# Patient Record
Sex: MALE | Race: WHITE
[De-identification: names, ages, dates, MRNs, and addresses within clinical notes are randomized per-mention and may not be internally consistent; named-entity substitution may affect disease eponyms.]

---

## 2019-02-21 ENCOUNTER — HOSPITAL ENCOUNTER (EMERGENCY)
Dept: HOSPITAL 76 - ED | Age: 19
Discharge: HOME | End: 2019-02-21
Payer: COMMERCIAL

## 2019-02-21 VITALS — SYSTOLIC BLOOD PRESSURE: 168 MMHG | DIASTOLIC BLOOD PRESSURE: 72 MMHG

## 2019-02-21 DIAGNOSIS — R03.0: ICD-10-CM

## 2019-02-21 DIAGNOSIS — F17.200: ICD-10-CM

## 2019-02-21 DIAGNOSIS — L03.114: Primary | ICD-10-CM

## 2019-02-21 PROCEDURE — 87070 CULTURE OTHR SPECIMN AEROBIC: CPT

## 2019-02-21 PROCEDURE — 87205 SMEAR GRAM STAIN: CPT

## 2019-02-21 PROCEDURE — 87181 SC STD AGAR DILUTION PER AGT: CPT

## 2019-02-21 PROCEDURE — 99283 EMERGENCY DEPT VISIT LOW MDM: CPT

## 2019-02-21 NOTE — ED PHYSICIAN DOCUMENTATION
History of Present Illness





- Stated complaint


Stated Complaint: L HAND BITE/SWELLING





- Chief complaint


Chief Complaint: General





- History obtained from


History obtained from: Patient





- History of Present Illness


Timing: Last night (Healthy 18-year-old gentleman who is right-handed and active

duty in the [employer] thinks he might of gotten bitten by something last night.  He 

has a pustule on the dorsum of the left hand with surrounding redness.  No 

fevers.)





Review of Systems


Constitutional: reports: Reviewed and negative


Nose: reports: Reviewed and negative


Cardiac: reports: Reviewed and negative





PD PAST MEDICAL HISTORY





- Past Medical History


Past Medical History: No





- Past Surgical History


Past Surgical History: No





- Present Medications


Home Medications: 


                                Ambulatory Orders











 Medication  Instructions  Recorded  Confirmed


 


Cephalexin [Keflex] 500 mg PO Q6H #28 capsule 02/21/19 


 


Sulfamethoxazole/Trimethoprim 1 each PO BID #14 tablet 02/21/19 





[Sulfamethoxazole-Tmp Ds Tablet]   














- Allergies


Allergies/Adverse Reactions: 


                                    Allergies











Allergy/AdvReac Type Severity Reaction Status Date / Time


 


No Known Drug Allergies Allergy   Verified 02/21/19 18:38














- Social History


Does the pt smoke?: Yes


Smoking Status: Current every day smoker


Does the pt drink ETOH?: No


Does the pt have substance abuse?: No





- Immunizations


Immunizations are current?: Yes





- POLST


Patient has POLST: No





PD ED PE NORMAL





- Vitals


Vital signs reviewed: Yes





- General


General: Alert and oriented X 3, No acute distress





- Extremities


Extremities: No calf tenderness / cord, Other (There is a tiny pustule on the 

dorsum of the left hand near the fourth MCP with cellulitis of most of the 

dorsum of the hand but no pain out of proportion to exam, crepitance, or limited

range of motion.  The pustule was expressed manually during examination and some

serous fluid was obtained and sent for culture.)





- Neuro


Neuro: Alert and oriented X 3, Normal speech





Results





- Vitals


Vitals: 





                               Vital Signs - 24 hr











  02/21/19





  18:36


 


Temperature 37.4 C


 


Heart Rate 79


 


Respiratory 20





Rate 


 


Blood Pressure 168/72 H


 


O2 Saturation 98








                                     Oxygen











O2 Source                      Room air

















Departure





- Departure


Disposition: 01 Home, Self Care


Clinical Impression: 


 Cellulitis of left hand





Condition: Good


Record reviewed to determine appropriate education?: Yes


Instructions:  Cellulitis Dc


Prescriptions: 


Cephalexin [Keflex] 500 mg PO Q6H #28 capsule


Sulfamethoxazole/Trimethoprim [Sulfamethoxazole-Tmp Ds Tablet] 1 each PO BID #14

tablet


Comments: 


We are performing a wound culture, the results should be done in 48-72 hours.  

If antibiotic change is necessary we will call you.  Return if worse in the 

meantime, especially if you develop increased pain, fevers, cannot keep down the

medication.  Otherwise follow-up with your physician in approximately 2-3 days.





Your blood pressure was elevated today on check into the emergency department.  

This does not mean that you have hypertension, it is a common phenomenon to come

to the emergency department and have elevated blood pressure.  I recommend that 

you see your primary care physician within the week to have it rechecked when 

you are feeling better.

## 2019-02-22 ENCOUNTER — HOSPITAL ENCOUNTER (OUTPATIENT)
Dept: HOSPITAL 76 - ED | Age: 19
Setting detail: OBSERVATION
LOS: 1 days | Discharge: HOME | End: 2019-02-23
Attending: SPECIALIST | Admitting: SPECIALIST
Payer: COMMERCIAL

## 2019-02-22 DIAGNOSIS — F17.220: ICD-10-CM

## 2019-02-22 DIAGNOSIS — L03.114: Primary | ICD-10-CM

## 2019-02-22 DIAGNOSIS — F17.210: ICD-10-CM

## 2019-02-22 LAB
ALBUMIN DIAFP-MCNC: 4.3 G/DL (ref 3.2–5.5)
ALBUMIN/GLOB SERPL: 1.6 {RATIO} (ref 1–2.2)
ALP SERPL-CCNC: 83 IU/L (ref 42–121)
ALT SERPL W P-5'-P-CCNC: 34 IU/L (ref 10–60)
ANION GAP SERPL CALCULATED.4IONS-SCNC: 12 MMOL/L (ref 6–13)
AST SERPL W P-5'-P-CCNC: 26 IU/L (ref 10–42)
BASOPHILS NFR BLD AUTO: 0.1 10^3/UL (ref 0–0.1)
BASOPHILS NFR BLD AUTO: 0.4 %
BILIRUB BLD-MCNC: 1.4 MG/DL (ref 0.2–1)
BUN SERPL-MCNC: 13 MG/DL (ref 6–20)
CALCIUM UR-MCNC: 8.9 MG/DL (ref 8.5–10.3)
CHLORIDE SERPL-SCNC: 100 MMOL/L (ref 101–111)
CO2 SERPL-SCNC: 24 MMOL/L (ref 21–32)
CREAT SERPLBLD-SCNC: 0.9 MG/DL (ref 0.6–1.2)
EOSINOPHIL # BLD AUTO: 0.3 10^3/UL (ref 0–0.7)
EOSINOPHIL NFR BLD AUTO: 2 %
ERYTHROCYTE [DISTWIDTH] IN BLOOD BY AUTOMATED COUNT: 13.6 % (ref 12–15)
GFRSERPLBLD MDRD-ARVRAT: 108 ML/MIN/{1.73_M2} (ref 89–?)
GLOBULIN SER-MCNC: 2.7 G/DL (ref 2.1–4.2)
GLUCOSE SERPL-MCNC: 135 MG/DL (ref 70–100)
HGB UR QL STRIP: 16 G/DL (ref 14–18)
LIPASE SERPL-CCNC: 23 U/L (ref 22–51)
LYMPHOCYTES # SPEC AUTO: 0.9 10^3/UL (ref 1.5–3.5)
LYMPHOCYTES NFR BLD AUTO: 5.7 %
MCH RBC QN AUTO: 30.9 PG (ref 27–31)
MCHC RBC AUTO-ENTMCNC: 34.3 G/DL (ref 32–36)
MCV RBC AUTO: 90.1 FL (ref 80–94)
MONOCYTES # BLD AUTO: 0.7 10^3/UL (ref 0–1)
MONOCYTES NFR BLD AUTO: 4.5 %
NEUTROPHILS # BLD AUTO: 13.5 10^3/UL (ref 1.5–6.6)
NEUTROPHILS # SNV AUTO: 15.5 X10^3/UL (ref 4.8–10.8)
NEUTROPHILS NFR BLD AUTO: 87.4 %
PDW BLD AUTO: 7.9 FL (ref 7.4–11.4)
PLATELET # BLD: 218 10^3/UL (ref 130–450)
PROT SPEC-MCNC: 7 G/DL (ref 6.7–8.2)
RBC MAR: 5.18 10^6/UL (ref 4.7–6.1)
SODIUM SERPLBLD-SCNC: 136 MMOL/L (ref 135–145)

## 2019-02-22 PROCEDURE — 80053 COMPREHEN METABOLIC PANEL: CPT

## 2019-02-22 PROCEDURE — 85025 COMPLETE CBC W/AUTO DIFF WBC: CPT

## 2019-02-22 PROCEDURE — 99283 EMERGENCY DEPT VISIT LOW MDM: CPT

## 2019-02-22 PROCEDURE — 96366 THER/PROPH/DIAG IV INF ADDON: CPT

## 2019-02-22 PROCEDURE — 83605 ASSAY OF LACTIC ACID: CPT

## 2019-02-22 PROCEDURE — 96365 THER/PROPH/DIAG IV INF INIT: CPT

## 2019-02-22 PROCEDURE — 83690 ASSAY OF LIPASE: CPT

## 2019-02-22 PROCEDURE — 36415 COLL VENOUS BLD VENIPUNCTURE: CPT

## 2019-02-22 PROCEDURE — 73220 MRI UPPR EXTREMITY W/O&W/DYE: CPT

## 2019-02-22 PROCEDURE — 87040 BLOOD CULTURE FOR BACTERIA: CPT

## 2019-02-22 RX ADMIN — SODIUM CHLORIDE, PRESERVATIVE FREE SCH: 5 INJECTION INTRAVENOUS at 17:36

## 2019-02-22 NOTE — MRI REPORT
Reason:  SPREADING CELULITTUS, POSS ABCESS

Procedure Date:  02/22/2019   

Accession Number:  [id number] / [id number]                    

Procedure:  MRI - Hand LT W/WO CPT Code:  

 

FULL RESULT:

 

 

IMPRESSION:llulitis. No drainable abscess collection. No evidence of 

osteomyelitis.

IMPRESSION:llulitis. No drainable abscess collection. No evidence of 

osteomyelitis.

## 2019-02-22 NOTE — HISTORY & PHYSICAL EXAMINATION
DATE OF SERVICE: 02/22/2019

Physician: Ivonne Cormier MD

 

PRIMARY CARE PROVIDER:  [facility].

 

ORTHOPEDIC SURGEON:  Elkin Spears.  Clinic [phone] or [phone], cell
phone [phone].

 

ADMITTING PROVIDER:  Ivonne Cormier MD.

 

CHIEF COMPLAINT:  Left hand infection unresponsive to outpatient management.

 

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 
personnel.  He is completely healthy and states that the only bad thing he does 
to his body is smoke half a pack per day.  He has no history of diabetes, 
immunosuppressive disorders, and is usually in good health.  He woke up 
yesterday morning with a painful left hand.  He had a small pimple between his 
fourth and fifth finger on the dorsum of his hand near his fourth knuckle.  That
part of the hand over the dorsum was red and swollen.  He was seen in the 
emergency room by Dr. Aram Keyes, where he was afebrile.  The pimple was 
popped, and a small amount of fluid was expressed.  He was put on empiric 
antibiotic therapy with Keflex and Bactrim.  He did receive a dose of IV 
antibiotics in the ER.  The patient did not fill his prescriptions overnight.  
He did not get any medication until this morning.  



He was seen in the family practice clinic with orthopedic consult by Dr. Elkin Spears at the [facility].  Dr. Spears felt that this patient has failed 
outpatient therapy, he can clearly see a delineation of the redness and heat 
where the pen marking from yesterday identifies yesterday's exam.  Today's exam 
shows inches of more skin recruited, more of the hand is swollen and tender.  
His white blood count is now 14.  He does not have a fever.

 

Dr. Spears has requested observation status.  He would like an MRI of the hand 
done to make sure there is no abscess in that knuckle space requiring surgery.

 

PAST MEDICAL HISTORY:  None.  Patient has never been hospitalized, never had any
surgeries.  He has no medical illnesses.

 

MEDICATIONS:  None.  He has not taken the Keflex or Bactrim prescribed to him 
until just this morning.  He does not take anything on a regular basis.

 

ALLERGIES:  NO KNOWN DRUG ALLERGIES.

 

SOCIAL HISTORY:  He is from [place].  He has been active duty personnel for 
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  He smokes 1/2 pack per day, as 
well as chew.  He does not drink at all because he is under age.

 

FAMILY HISTORY:  Mom is 41 years old and has pancreas divisum.  She has had 
bouts of pancreatitis.  But no high blood pressure, diabetes, cancer, heart 
attack, stroke, thyroid.

 

Dad is 42 and completely healthy.

 

Two younger sisters are completely healthy.

 

He has no children.  

 

He is FULL CODE status. 

 

PREVIOUS LEVEL OF FUNCTION:  Active duty [employer] personnel.  Completely independent
for activities of daily living, uses no durable medical equipment.  Drives, etc.

 

REVIEW OF SYSTEMS:  A review of systems was obtained for constitutional, ENT, 
cardiac, pulmonary, GI, , skin, joint, psych and CNS.  All were negative other
than for this current symptomatology of his left hand.

 

PHYSICAL EXAMINATION 

He is seen in the emergency room.

VITAL SIGNS:  Temperature is 37.1, pulse is 89, blood pressure 146/72, 
respirations 18, 99% on room air.

GENERAL:  He is a young 6-foot 3-inch, white male at 108 kilograms.  He is 
alert, oriented, in no acute distress, with his commanding officer/boss at the 
bedside.

HEENT:  Normocephalic, atraumatic skull.  Pupils equal, round, reactive to light
and accommodation.  Sclerae nonicteric.  Moist pink oral mucosa.  No facial 
asymmetry.  Speech normal.  Back of throat normal.

NECK:  Supple without goiter,  adenopathy or bruits.

LUNGS:  Clear to auscultation and percussion.  No crackles, rhonchi, or 
wheezing.  No increased respiratory effort speaking to me. 

CARDIAC:  Regular rate and rhythm without any murmurs, rubs or gallops.

ABDOMEN:  Soft, nontender.  No organomegaly.  Normal bowel sounds.

EXTREMITIES:  No clubbing, cyanosis or edema.  All grossly normal in appearance.
 That is, until you get to the left hand.  He has the same pimple boil between 
the fourth and fifth fingers on the left hand on the dorsum of his hand.  It is 
closer to his fourth knuckle.  Redness has enveloped fifth, fourth and third 
fingers on the dorsum.  There is diffuse finger swelling of sausage digits.  
Redness and cellulitis and heat has extended the entire dorsum of his hand up to
almost the wrist now.  There is a small spot above the ulnar joint space where 
the redness and heat are extending up there.  He has flexion and extension of 
his hand intact.  Flexion and extension of his finger digits and metacarpals.

NEUROLOGIC:  He is alert and oriented to person, place and time.  Follows 2-step
commands normally.  Speech is normal.  Cranial nerves 2-12 are normal.  Upper 
and lower extremity strength testing is normal.  Upper and lower extremity 
sensation is normal.  He has no gait ataxia, and can sit to transfer to a 
standing position without any difficulty or gait ataxia.  He does it in less 
than 15 seconds. 

 

LABORATORY DATA:  Sodium 136, potassium 3.6, BUN 13, creatinine 0.9, glucose 
135, lactic acid 1.3, total bilirubin 1.4.  Liver enzymes normal.  Lipase 23.  
White cell count is 15.5, hemoglobin 16, hematocrit 46.  Neutrophils are high.

 

ASSESSMENT AND PLAN 

1.  Cellulitis, left hand.  There is no previous blow to the hand.  No injury.  
He lives in a dorm situation with multiple other [employer] personnel.  No one else 
has boils or furuncles.  I suspect methicillin-resistant Staphylococcus aureus.

PLAN

1.  Observation stay.

2.  ATTESTATION:  The patient will be discharged within 96 hours.

3.  Vancomycin per pharmacy protocol.

4.  Check daily BMP and CBC to assess response.

5.  Orthopedic consult.

6.  MRI of left hand to assess for abscess.

 

The patient has failed outpatient management with worsening infection and a 
rising white cell count.

 

2.  FULL CODE STATUS.

 

3.  Deep venous thrombosis prophylaxis will be SERVANDO hose.  He is at low risk.  
Ambulatory.

 

 

DD: 02/22/2019 15:42

TD: 02/22/2019 15:56

Job #: [id number]



**************************** 

Revised 02/26/2019 jll

Account/Transcription Correction

Orig. signed 02/23/2019 @ 1639

****************************

OZZIE

## 2019-02-22 NOTE — ED PHYSICIAN DOCUMENTATION
History of Present Illness





- Stated complaint


Stated Complaint: LT HAND SWELLING/SENT BY 





- Chief complaint


Chief Complaint: General





- History obtained from


History obtained from: Patient, Friend





- History of Present Illness


Timing: Yesterday


Pain level max: 6


Pain level now: 4





- Additonal information


Additional information: 





18-year-old male with cellulitis of the left hand, seen here last night and 

given a dose of Bactrim.  Symptoms have worsened today.  Was told to return for 

admission to the hospital. Worse with movement, better with rest





Review of Systems


Ten Systems: 10 systems reviewed and negative


Constitutional: denies: Fever, Chills


Respiratory: denies: Cough


GI: denies: Abdominal Pain, Vomiting


Skin: denies: Rash





PD PAST MEDICAL HISTORY





- Past Medical History


Past Medical History: No





- Past Surgical History


Past Surgical History: No





- Present Medications


Home Medications: 


                                Ambulatory Orders











 Medication  Instructions  Recorded  Confirmed


 


Cephalexin [Keflex] 500 mg PO Q6H #28 capsule 02/21/19 


 


Sulfamethoxazole/Trimethoprim 1 each PO BID #14 tablet 02/21/19 





[Sulfamethoxazole-Tmp Ds Tablet]   














- Allergies


Allergies/Adverse Reactions: 


                                    Allergies











Allergy/AdvReac Type Severity Reaction Status Date / Time


 


No Known Drug Allergies Allergy   Verified 02/22/19 14:01














- Social History


Does the pt smoke?: Yes


Smoking Status: Current every day smoker


Does the pt drink ETOH?: No


Does the pt have substance abuse?: No





- Immunizations


Immunizations are current?: Yes





- POLST


Patient has POLST: No





PD ED PE NORMAL





- Vitals


Vital signs reviewed: Yes





- General


General: Alert and oriented X 3, No acute distress





- HEENT


HEENT: Moist mucous membranes





- Neck


Neck: Supple, no meningeal sign





- Cardiac


Cardiac: RRR





- Respiratory


Respiratory: No respiratory distress, Clear bilaterally





- Abdomen


Abdomen: Soft, Non tender, Non distended





- Derm


Derm: Warm and dry





- Extremities


Extremities: Other (L hand swelling and erythema (5x6cm) to the dorsum of the 

hand. Pain in the dorsum of the hand with movement of the fingers. NVI. No 

crepitus. )





- Neuro


Neuro: Alert and oriented X 3





Results





- Vitals


Vitals: 


                               Vital Signs - 24 hr











  02/22/19





  13:56


 


Temperature 37.1 C


 


Heart Rate 89


 


Respiratory 18





Rate 


 


O2 Saturation 99








                                     Oxygen











O2 Source                      Room air

















- Labs


Labs: 


                                Laboratory Tests











  02/22/19 02/22/19 02/22/19





  14:29 14:29 14:32


 


WBC  15.5 H  


 


RBC  5.18  


 


Hgb  16.0  


 


Hct  46.6  


 


MCV  90.1  


 


MCH  30.9  


 


MCHC  34.3  


 


RDW  13.6  


 


Plt Count  218  


 


MPV  7.9  


 


Neut # (Auto)  13.5 H  


 


Lymph # (Auto)  0.9 L  


 


Mono # (Auto)  0.7  


 


Eos # (Auto)  0.3  


 


Baso # (Auto)  0.1  


 


Absolute Nucleated RBC  0.01  


 


Nucleated RBC %  0.0  


 


Sodium   136 


 


Potassium   3.6 


 


Chloride   100 L 


 


Carbon Dioxide   24 


 


Anion Gap   12.0 


 


BUN   13 


 


Creatinine   0.9 


 


Estimated GFR (MDRD)   108 


 


Glucose   135 H 


 


Lactic Acid    1.3


 


Calcium   8.9 


 


Total Bilirubin   1.4 H 


 


AST   26 


 


ALT   34 


 


Alkaline Phosphatase   83 


 


Total Protein   7.0 


 


Albumin   4.3 


 


Globulin   2.7 


 


Albumin/Globulin Ratio   1.6 


 


Lipase   23 














PD MEDICAL DECISION MAKING





- ED course


Complexity details: reviewed results, re-evaluated patient, considered 

differential, d/w patient


ED course: 





18-year-old male with cellulitis worsening despite oral antibiotics of the 

dorsum of the left hand.  Pain with range of motion of the fingers.  No palmar 

tenderness.  No evidence of deep space infection.  No crepitus.  Was supposed to

be directly placed in obs to the hospital, but was registered in the emergency 

department, therefore I contacted the hospitalist and it was requested that I 

order the MRI in the emergency department due to the time of day.  Therefore 

this was performed.  IV antibiotics given.  Blood drawn.  Patient will be placed

in observation with the hospitalist, Dr. Cormier.





This document was made in part using voice recognition software. While efforts 

are made to proofread this document, sound alike and grammatical errors may 

occur.





Departure





- Departure


Disposition: ED Place in Observation


Clinical Impression: 


 Cellulitis of left hand





Condition: Stable

## 2019-02-23 VITALS — DIASTOLIC BLOOD PRESSURE: 53 MMHG | SYSTOLIC BLOOD PRESSURE: 134 MMHG

## 2019-02-23 RX ADMIN — SODIUM CHLORIDE SCH MLS/HR: 9 INJECTION, SOLUTION INTRAVENOUS at 00:17

## 2019-02-23 RX ADMIN — SODIUM CHLORIDE SCH MLS/HR: 9 INJECTION, SOLUTION INTRAVENOUS at 08:26

## 2019-02-23 RX ADMIN — SODIUM CHLORIDE, PRESERVATIVE FREE SCH: 5 INJECTION INTRAVENOUS at 00:33

## 2019-02-23 RX ADMIN — SODIUM CHLORIDE, PRESERVATIVE FREE SCH: 5 INJECTION INTRAVENOUS at 10:42

## 2019-02-23 NOTE — CONSULTATION NOTE
Referring Provider


Name of Referring Provider:: Ivonne Cormier


Consult Date: 02/23/19





Chief Complaint





- Chief Complaint


Chief Complaint: Left hand cellulitis





History of Present Illness





- History of Present Illness


HPI Comment/Other: 





Patient is a 18-year-old right-hand-dominant male who presented to the aviation 

medical clinic at [facility] on the morning of 22 February 2019 for follow-up of an ED visit at Located within [facility] on 21 February 2019.  The patient reports that on Thursday morning he awoke with a 

pustule on the dorsal aspect of his left hand ring finger and had associated 

redness pain and swelling.  He presented to the [facility] ED for evaluation,

and was diagnosed with left hand cellulitis.  He was given a dose of antibiotics

and prescription for Keflex and Septra, which he was attempting to fill on the 

morning of 22 February.  





He was seen in aviation medicine, and worsening of the cellulitis was noted, and

the patient was walked over to the orthopedic clinic.  He denied fever or 

chills.  When directly questioned about possible fight bite mechanism of injury 

he was adamant that he had not been involved in an altercation nor punched 

anything.  He denies any specific memory of insect spider or animal bites.  He 

is otherwise healthy, and has not had an occurrence like this previously.





On exam in clinic, his hand was erythematous warm and swollen with significant 

hyperemia, he was diffusely tender to palpation, worse near the interspace 

between the third and fourth MCP J's and the fourth and fifth MTPJ's.  He did 

not have volar tenderness along the flexor tendon sheath nor pain along the 

flexor tendon with passive extension of the digit.  He did not have pain in the 

thenar eminence hyperthenar eminence or mid space of the hand.  He was able to 

weakly actively flex and extend the digits due to pain.  Axial loading of the 

third fourth and fifth MCP J's did not increased pain at the joint.  The 

erythema was noted to extend approximately to the previously marked demarcation 

line on the dorsum of his hand.  He reports that he had not been splinted and 

nor elevated since discharge from the ED.  





He had an elevated white count at 14.6 and an elevated C-reactive protein at 3.7

mg/dL. Given the progression of his symptoms in less than 24 hours, there was 

initial concern for a possible deep space infection or collar-button abscess.  

Recommendation was made to admit the patient to [facility] for observation, 

splinting, IV antibiotics, and an MRI to evaluate for possible abscess.  The 

patient was admitted on 22February, and the MRI was completed on the evening of 

22 February. The MRI findings were consistent with cellulitis, without abscess 

or osteomyelitis.








The patient was examined at bedside on the morning of 23 February. He had been 

splinted and elevated overnight and received IV vancomycin.  There was a 

significant improvement in his symptoms, with less swelling, erythema, and 

improved finger range of motion.  His hand was globally less tender to 

palpation.  He remained afebrile overnight. His pain was well controlled.





History





- Past Medical History


Cardiovascular: reports: None


Neuro: reports: None


Endocrine/Autoimmune: reports: None


GI: reports: None


: reports: None


HEENT: reports: None


Psych: reports: None


Musculoskeletal: reports: None, Other (As per HPI)


Derm: reports: None, Other (Cellulitis as per HPI)


MRSA Hx?: No





- Family & Social History


Living arrangement: Other ([facility])





- Substance History


Use: Uses substance without health or social issues: Tobacco (Cigarettes and 

chew)


Tobacco Details: Cigarettes, Chewing Tobacco





- POLST


Patient has POLST: No





Meds/Allgy





- Home Medications


Home Medications: 


                                Ambulatory Orders











 Medication  Instructions  Recorded  Confirmed


 


Cephalexin [Keflex] 500 mg PO QID 02/23/19 02/23/19


 


Sulfamethoxazole/Trimethoprim 1 each PO BID #14 tablet 02/23/19 





[Bactrim 400-80 mg Tablet]   


 


oxyCODONE [Roxicodone] 5 mg PO Q4HR PRN #30 tablet 02/23/19 














- Allergies


Allergies/Adverse Reactions: 


                                    Allergies











Allergy/AdvReac Type Severity Reaction Status Date / Time


 


No Known Drug Allergies Allergy   Verified 02/22/19 14:01














Review of Systems





- Constitutional


Constitutional: denies: Fatigue, Fever, Chills, Malaise, Weakness, Night sweats





- Cardiovascular


Cariovascular: denies: Palpitations, Chest pain





- Respiratory


Respiratory: denies: Cough, Wheezing





- Gastrointestinal


Gastrointestinal: denies: Constipation, Diarrhea, Nausea, Vomiting





- Musculoskeletal


Musculoskeletal: reports: Other (As per HPI left hand pain and swelling)





- Integumentary


Integumentary: reports: Other (As per HPI cellulitis)





- Neurological


Neurological: denies: General weakness, Focal weakness, Numbness





- Hematologic/Lymphatic


Hematologic/Lymphatic: denies: Recurrent infections





- All Other Systems


All Other Systems: reports: Reviewed and negative





Exam





- Vital Signs


Reviewed Vital Signs: Yes


Vital Signs: 





                                Vital Signs x48h











  Temp Pulse Resp BP Pulse Ox


 


 02/23/19 07:56  36.8 C  65  16  117/50 L  97


 


 02/23/19 04:04  37.0 C  65  16  130/57 L  97














- Physical Exam


General Appearance: positive: No acute distress


Eyes Bilateral: positive: Normal inspection


Neck: positive: Nml inspection


Respiratory: positive: No respiratory distress


Peripheral Pulses: positive: 2+


Extremities: positive: Other (Left hand:    Skin: Small scabbed area near the 

base of the ring finger possible phalanx, this was the previously described 

pustule. Inspection: Erythema and swelling improved since evaluation on 22 February.  Skin less hyperemic.  Decreased swelling with improved hand contours.

 Palpation: Less tender to palpation over the dorsum of the hand, especially in 

the 34 and 45 MCP J interspaces.  No thenar tenderness, no hypothenar 

tenderness.   ROM: Can flex and extend the fingers can abduct and adduct the 

fingers tightly. Sensation: Intact to light touch median radial ulnar 

distributions and ulnar and radial tip of the affected digit. Motor: 5 out of 5 

EPL, FPL, IO Vascular: 2+ radial pulse.)


Neurologic/Psychiatric: positive: Oriented x3





Conclusion/Plan





- Diagnosis


Diagnosis: Left hand cellulitis





- Plan


Plan: 





18-year-old right-hand-dominant male with exam and imaging consistent with left 

hand cellulitis.  Since admission for observation his clinical exam has improved

 with splinting, elevation and treatment with IV vancomycin.  He has remained 

afebrile.  Repeat CBC at 0 545 this morning showed a decrease in his white count

 to 10.3K.


- Given his improvement in both clinical and laboratory parameters, transition 

to oral antibiotics consisting of Keflex and Septra DS.  


- Continue splinting and elevation.


- Patient okay to discharge home on oral antibiotics.


- Patient given strict return criteria:  Patient instructed to return to the ED 

should pain worsen, hand swelling increase, redness begins to spread proximally 

or distally (extent of the redness marked in gentian violet with dots at hill

roximately 0750 on 23 February by me).  Patient instructed to return to the ED 

should he develop a fever, chills, night sweats, or any other symptoms that 

suggest progression of infection.


- Patient to follow-up with my partner, Dr. Scot Paez, at 0800 on Monday 

morning in the orthopedics clinic at [facility].


- He should continue strict splinting and elevation until follow-up.  Assuming 

that he continues to progress, and as his symptoms resolve we will initiate 

early range of motion.








- Lab Results


Lab results reviewed: Yes


Fish Bones: 


                                 02/22/19 14:29





                                 02/22/19 14:29





- Diagnostic Imaging Results


Diagnostic Imaging Results: positive: Final report reviewed


Diagnostic Imaging Results Comments: 





MRI left hand with and without contrast consistent with cellulitis, no deep 

space infection noted

## 2019-02-23 NOTE — DISCHARGE SUMMARY
Physician: Ivonne Cormier MD

DATE OF ADMISSION: 02/22/2019

DATE OF DISCHARGE:  02/23/2019

 

DISCHARGE DIAGNOSES

1.  Left hand cellulitis.

2.  Failure of outpatient therapy.

  

DISCHARGE MEDICATIONS

1.  Keflex 500 mg p.o. q.i.d.

2.  Bactrim double strength p.o. b.i.d.

3.  Roxicodone 5 mg p.o. q.4 hours p.r.n., #30.

 

PRINCIPAL PROCEDURE:  MRI of hand with no drainable abscess collection, no evidence of osteomyelitis.


 

HOSPITAL COURSE:  He is a 18-year-old, white male who is completely healthy.  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  He woke up on the morning before admission with a very painful fourth knuckle of finge
r that seemed to have a blister on it.  He felt like it was an insect bite.  He went to the emergency
 room, where he was seen to have cellulitis compatible with MRSA cellulitis.  The blister was lanced,
 and he was given Keflex and Bactrim.  At that time, no labs were done and he did not have a fever.  
He could not get his medications filled until today.  On the day of admission, he woke up with the ha
nd even more swollen, more tender and redness now extending to the entire dorsum of his hand to his w
rist.  He was seen at [facility] the [facility] by Orthopedics.  They were alarmed at
 the tenderness of the joint with effusion below with possible abscess.  His fourth and fifth fingers
 were now sausage-like, cellulitis was on the dorsum of the hand, extending almost just above the uln
ar joint.  White cell count was 15.6 thousand in their clinic.

 

The patient was referred to us for direct admission because of failure of outpatient management.  On 
admission, the patient was afebrile.  White cell count was 15.5 thousand.  Temperature was 37.1.  Pul
se was 76, blood pressure 136/62, respirations 18, 97% on room air.

 

The patient was immediately started on vancomycin.  Cultures from the day before were still pending. 
 With this admission, he had blood cultures redone.

 

Overnight, the hand improved tremendously.  Edema was still present in the fourth and fifth fingers, 
and now included the index and third fingers.  The redness and heat on the dorsum of hand had abated 
considerably.  The one punctate lesion at the base of the fourth knuckle at the MCP dorsal hand was d
ry.  The redness that had gone up to the ulnar joint had now receded.  The patient was able to flex h
is wrist.  He was also kept with his hand elevated overnight.  Orthopedic Surgery saw him in consult 
and felt he was stable for discharge, in view of the fact that the MRI showed no abscess, no tenosyno
vitis that needed surgery.

 

He still has the Keflex that he was discharged with.  For some reason, the Bactrim was not filled.  A
s such, Bactrim was called into [facility], as was Roxicodone 30 tablets.

 

He has received strict instructions from Dr. Spears, Orthopedics.  He is to see them in the clinic 8 
o'clock Monday morning.  They will reassess his hand.  They will reassess when he can return to work.
  I have also asked the patient to please stop smoking, since he smokes half a pack per day, and we d
escribed the microvascular complications that would impede wound healing.

 

He is discharged in stable condition.

 

PHYSICAL EXAMINATION

VITAL SIGNS:  Temperature 36.9.  T-max was 37.4.

GENERAL:  White cell count was normal at 10.3 (lactic acid had been 1.3 on admission).  He is a young
, 18-year-old male in no acute distress.

NECK:  Supple.  No adenopathy.

LUNGS:  Clear to auscultation and percussion.  He has absolutely no respiratory distress with walking
, talking.

HEART:  PMI is normally placed with a regular rate and rhythm.

ABDOMEN:  Soft, nontender.  No organomegaly.

EXTREMITIES:  Without clubbing, cyanosis or edema, except for that left hand.

NEUROLOGIC:  He is able to sit up to stand to transfer, ambulate without assistance.

 

 

DD: 02/23/2019 11:57

TD: 02/23/2019 12:04

Job #: [id number]

## 2019-02-23 NOTE — DISCHARGE PLAN
Discharge Plan


Disposition: 01 Home, Self Care


Condition: Good


Prescriptions: 


oxyCODONE [Roxicodone] 5 mg PO Q4HR PRN #30 tablet


 PRN Reason: Pain 5 to 7


Sulfamethoxazole/Trimethoprim [Bactrim 400-80 mg Tablet] 1 each PO BID #14 

tablet


Diet: Regular


Activity Restrictions: Dr. Spears wants you to elevate arm and and with splints 

in place


Shower Restrictions: No


Driving Restrictions: No


Assistance Devices: Other (splints)


Additional Instructions or Follow Up instructions: 


You were placed in observation because you have an infection in your hand.  We 

think the infection is methicillin-resistant staph aureus.  You had gotten worse

in the 24 hours prior to coming into the hospital.  The MRI during this stay 

shows that there is no abscess or fluid collection along the joint or tendon 

sheath.  We think that you will successfully recover with oral antibiotics.  You

will be sent home on 2 antibiotics called Keflex and Bactrim.  You need to 

finish both of them.  Do not skip doses.  Do not drink alcohol with these 

antibiotics.





For pain, you are being sent home with 30 tablets of oxycodone.  Use those 

sparingly.  If possible use 1 tablet of oxycodone with 1 Tylenol or 1 Motrin up 

to 3 times a day for pain.  Oxycodone can constipate you to make sure you take a

stool softener while you are taking these pills.





Please see your orthopedic surgeon on Monday morning at 8 AM as he instructed.  

Dr. Paez will be the surgeon that sees you.  It will be he the decides when 

you will go back to work.  You can take off the splints to take a bath or 

shower.  We will put them back on.


No Smoking: If you smoke, Please STOP!  Call [phone] for help.


Follow-up with: 


SALOMÓN WHITEHEAD [Primary Care Provider] -

## 2019-02-26 ENCOUNTER — HOSPITAL ENCOUNTER (EMERGENCY)
Dept: HOSPITAL 76 - ED | Age: 19
Discharge: HOME | End: 2019-02-26
Payer: COMMERCIAL

## 2019-02-26 VITALS — SYSTOLIC BLOOD PRESSURE: 112 MMHG | DIASTOLIC BLOOD PRESSURE: 64 MMHG

## 2019-02-26 DIAGNOSIS — L03.114: Primary | ICD-10-CM

## 2019-02-26 DIAGNOSIS — F17.200: ICD-10-CM

## 2019-02-26 DIAGNOSIS — T37.0X5A: ICD-10-CM

## 2019-02-26 DIAGNOSIS — R03.0: ICD-10-CM

## 2019-02-26 DIAGNOSIS — L27.0: ICD-10-CM

## 2019-02-26 PROCEDURE — 99283 EMERGENCY DEPT VISIT LOW MDM: CPT

## 2019-02-26 NOTE — ED PHYSICIAN DOCUMENTATION
PD HPI WOUND RECHECK





- Stated complaint


Stated Complaint: OPEN WOUND





- Chief complaint


Chief Complaint: Wound





- Histroy obtained from


History obtained from: Patient





- History of Present Illness


Location: Left Uppper Extremity (He was seen last week for a dorsal hand 

infection and sent home on Keflex and Bactrim.  For reasons that are completely 

clear only the Keflex got filled.  He bounced back the next day and was direct 

admitted and placed on vancomycin for a day.  The MRI was negative for abscess 

and he improved.  In the interim the initial wound culture grew regular staph, 

not MRSA.  He started Bactrim 3 days ago and today he has an itchy rash all over

and increased wound drainage.  No fevers.)





Review of Systems


Constitutional: denies: Fever, Chills


GI: denies: Abdominal Pain, Nausea, Vomiting


Skin: denies: Rash, Lesions





PD PAST MEDICAL HISTORY





- Past Medical History


Cardiovascular: None


Respiratory: None


Neuro: None


Endocrine/Autoimmune: None


GI: None


: None


HEENT: None


Psych: None


Musculoskeletal: None, Other (As per HPI)


Derm: None, Other (Cellulitis as per HPI)





- Past Surgical History


Past Surgical History: No





- Present Medications


Home Medications: 


                                Ambulatory Orders











 Medication  Instructions  Recorded  Confirmed


 


Cephalexin [Keflex] 500 mg PO QID 02/23/19 02/23/19


 


Sulfamethoxazole/Trimethoprim 1 each PO BID #14 tablet 02/23/19 





[Bactrim 400-80 mg Tablet]   


 


oxyCODONE [Roxicodone] 5 mg PO Q4HR PRN #30 tablet 02/23/19 


 


predniSONE [Deltasone] 60 mg PO DAILY 5 Days  tablet 02/26/19 














- Allergies


Allergies/Adverse Reactions: 


                                    Allergies











Allergy/AdvReac Type Severity Reaction Status Date / Time


 


No Known Drug Allergies Allergy   Verified 02/22/19 14:01














- Social History


Does the pt smoke?: Yes


Smoking Status: Current every day smoker


Does the pt drink ETOH?: No


Does the pt have substance abuse?: No





- Immunizations


Immunizations are current?: Yes





- POLST


Patient has POLST: No





PD ED PE NORMAL





- Vitals


Vital signs reviewed: Yes





- General


General: Alert and oriented X 3, No acute distress





- Derm


Derm: Other (He has a diffuse macular rash.  No oral lesions.  No peeling.  The 

dorsum of the left hand is swollen red but the cellulitis has improved but I was

 able to squeeze quite a bit of purulent material out of the pore on the hand.)





- Neuro


Neuro: Alert and oriented X 3, Normal speech





Results





- Vitals


Vitals: 


                               Vital Signs - 24 hr











  02/26/19





  17:54


 


Temperature 37.1 C


 


Heart Rate 94


 


Respiratory 18





Rate 


 


Blood Pressure 142/62 H


 


O2 Saturation 100








                                     Oxygen











O2 Source                      Room air

















PD MEDICAL DECISION MAKING





- ED course


ED course: 





He has improving cellulitis but a little more purulent drainage now that was 

completely expressed during examination.  Given the timing of suspect the sulfa 

antibiotic is the cause of the drug eruption and he is advised to discontinue 

this and is placed on steroids.





The pore on the hand now actually is significant purulent drainage and it was 

locally infiltrated with buffered lidocaine and then 1/4 inch packing was 

placed.





He is following up with the orthopedic surgeon tomorrow.  I left a message for 

him to call me.





Departure





- Departure


Disposition: 01 Home, Self Care


Clinical Impression: 


 Cellulitis of left hand, Drug eruption





Condition: Good


Record reviewed to determine appropriate education?: Yes


Instructions:  Cellulitis Dc


Prescriptions: 


predniSONE [Deltasone] 60 mg PO DAILY 5 Days  tablet


Comments: 


Discontinue the Bactrim.  Keep the hand elevated.  Return for worsening redness 

or swelling or pain.  Also return if you develop sores in your mouth.  Or fever.





Your blood pressure was elevated today on check into the emergency department.  

This does not mean that you have hypertension, it is a common phenomenon to come

 to the emergency department and have elevated blood pressure.  I recommend that

 you see your primary care physician within the week to have it rechecked when 

you are feeling better.

## 2022-06-01 ENCOUNTER — HOSPITAL ENCOUNTER (EMERGENCY)
Dept: HOSPITAL 76 - ED | Age: 22
Discharge: HOME | End: 2022-06-01
Payer: COMMERCIAL

## 2022-06-01 VITALS — DIASTOLIC BLOOD PRESSURE: 72 MMHG | SYSTOLIC BLOOD PRESSURE: 149 MMHG

## 2022-06-01 DIAGNOSIS — L03.113: Primary | ICD-10-CM

## 2022-06-01 DIAGNOSIS — F17.200: ICD-10-CM

## 2022-06-01 PROCEDURE — 99282 EMERGENCY DEPT VISIT SF MDM: CPT

## 2022-06-01 RX ADMIN — CLINDAMYCIN HYDROCHLORIDE STA MG: 150 CAPSULE ORAL at 20:03

## 2022-06-01 NOTE — ED PHYSICIAN DOCUMENTATION
History of Present Illness





- Stated complaint


Stated Complaint: R HAND SWELLING AND BURNING





- Chief complaint


Chief Complaint: Ext Problem





- History obtained from


History obtained from: Patient





- History of Present Illness


Timing: How many days ago (2)


Pain level max: 4


Pain level now: 3





- Additonal information


Additional information: 





22-year-old male presents to the emergency department with redness and swelling 

to the dorsum of the right hand. Had cellulitis to the other hand previously.  

Works as a  in [employer].  Tetanus up-to-date.  Worse with movement, 

better with rest.  No fevers.  No chills.





Review of Systems


Constitutional: denies: Fever, Chills


GI: denies: Nausea, Vomiting





PD PAST MEDICAL HISTORY





- Past Medical History


Past Medical History: Yes


Cardiovascular: None


Respiratory: None


Neuro: None


Endocrine/Autoimmune: None


GI: None


: None


HEENT: None


Psych: None


Musculoskeletal: None, Other


Derm: None, Other





- Past Surgical History


Past Surgical History: No





- Present Medications


Home Medications: 


                                Ambulatory Orders











 Medication  Instructions  Recorded  Confirmed


 


Sulfamethoxazole/Trimethoprim 1 each PO BID #14 tablet 02/23/19 





[Bactrim 400-80 mg Tablet]   


 


cephALEXin [Keflex] 500 mg PO QID 02/23/19 02/23/19


 


oxyCODONE [Roxicodone] 5 mg PO Q4HR PRN #30 tablet 02/23/19 


 


predniSONE [Deltasone] 60 mg PO DAILY 5 Days  tablet 02/26/19 


 


clindamycin HCL [Cleocin HCl] 300 mg PO Q6H #40 cap 06/01/22 














- Allergies


Allergies/Adverse Reactions: 


                                    Allergies











Allergy/AdvReac Type Severity Reaction Status Date / Time


 


Sulfa (Sulfonamide Allergy  Hives Verified 06/01/22 19:21





Antibiotics)     














- Social History


Does the pt smoke?: Yes


Smoking Status: Current every day smoker


Does the pt drink ETOH?: No


Does the pt have substance abuse?: No





- Immunizations


Immunizations are current?: Yes





- POLST


Patient has POLST: No





PD ED PE NORMAL





- Vitals


Vital signs reviewed: Yes





- General


General: Alert and oriented X 3, No acute distress





- Respiratory


Respiratory: No respiratory distress





- Derm


Derm: Warm and dry





- Extremities


Extremities: Other (Mild swelling and erythema to the dorsum of the right hand. 

Full range of motion.  No streaks.  No tenderness over the palmar aspect of the 

hand.  No abscess. Neurovascular intact)





- Neuro


Neuro: Alert and oriented X 3





Results





- Vitals


Vitals: 


                               Vital Signs - 24 hr











  06/01/22 06/01/22





  19:17 20:08


 


Temperature 36.9 C 36.8 C


 


Heart Rate 67 65


 


Respiratory 14 15





Rate  


 


Blood Pressure 155/76 H 149/72 H


 


O2 Saturation 98 99








                                     Oxygen











O2 Source                      Room air

















PD MEDICAL DECISION MAKING





- ED course


Complexity details: reviewed old records, considered differential, d/w patient


ED course: 





22-year-old male with cellulitis to the dorsum of the right hand.  We will place

on antibiotics.  He is allergic to sulfa, we will utilize clindamycin.  Patient 

counseled regarding signs and symptoms for which I believe and urgent re-

evaluation would be necessary. Patient with good understanding of and agreement 

to plan and is comfortable going home at this time





This document was made in part using voice recognition software. While efforts 

are made to proofread this document, sound alike and grammatical errors may 

occur.





Departure





- Departure


Disposition: 01 Home, Self Care


Clinical Impression: 


 Cellulitis of right hand





Condition: Good


Instructions:  ED Infec Skin Cellulitis


Follow-Up: 


your,doctor in 1 week [Other]


Prescriptions: 


clindamycin HCL [Cleocin HCl] 300 mg PO Q6H #40 cap


Comments: 


Take all antibiotics until gone.  Follow-up with your doctor next week for wound

check.  Return if you worsen.  Your prescriptions were sent to [facility] in [place].


Discharge Date/Time: 06/01/22 20:08